# Patient Record
Sex: FEMALE | Race: BLACK OR AFRICAN AMERICAN | Employment: FULL TIME | URBAN - METROPOLITAN AREA
[De-identification: names, ages, dates, MRNs, and addresses within clinical notes are randomized per-mention and may not be internally consistent; named-entity substitution may affect disease eponyms.]

---

## 2019-05-21 ENCOUNTER — OFFICE VISIT (OUTPATIENT)
Dept: PRIMARY CARE CLINIC | Age: 34
End: 2019-05-21

## 2019-05-21 VITALS
SYSTOLIC BLOOD PRESSURE: 109 MMHG | RESPIRATION RATE: 18 BRPM | BODY MASS INDEX: 27.78 KG/M2 | DIASTOLIC BLOOD PRESSURE: 75 MMHG | TEMPERATURE: 98.3 F | HEART RATE: 85 BPM | WEIGHT: 177 LBS | OXYGEN SATURATION: 99 % | HEIGHT: 67 IN

## 2019-05-21 DIAGNOSIS — F17.210 LIGHT CIGARETTE SMOKER: ICD-10-CM

## 2019-05-21 DIAGNOSIS — E66.3 OVERWEIGHT (BMI 25.0-29.9): ICD-10-CM

## 2019-05-21 DIAGNOSIS — Z11.3 ROUTINE SCREENING FOR STI (SEXUALLY TRANSMITTED INFECTION): ICD-10-CM

## 2019-05-21 DIAGNOSIS — Z11.59 NEED FOR HEPATITIS C SCREENING TEST: ICD-10-CM

## 2019-05-21 DIAGNOSIS — Z00.00 WELL ADULT HEALTH CHECK: Primary | ICD-10-CM

## 2019-05-21 DIAGNOSIS — F12.90 MARIJUANA USE: ICD-10-CM

## 2019-05-21 RX ORDER — AMOXICILLIN 500 MG/1
500 TABLET, FILM COATED ORAL 3 TIMES DAILY
COMMUNITY
End: 2021-08-05 | Stop reason: ALTCHOICE

## 2019-05-21 RX ORDER — MEDROXYPROGESTERONE ACETATE 150 MG/ML
INJECTION, SUSPENSION INTRAMUSCULAR
Refills: 3 | COMMUNITY
Start: 2019-04-27 | End: 2020-05-18 | Stop reason: SDUPTHER

## 2019-05-21 RX ORDER — BISMUTH SUBSALICYLATE 262 MG
1 TABLET,CHEWABLE ORAL DAILY
COMMUNITY

## 2019-05-21 RX ORDER — VITAMIN E CAP 100 UNIT 100 UNIT
CAP ORAL DAILY
COMMUNITY
End: 2022-07-16

## 2019-05-21 NOTE — PROGRESS NOTES
HPI:     Chief Complaint   Patient presents with    Well Woman     with pap       Bobby Hawkins is a 28 y.o. female with no significant history who presents for annual exam.  She feels well today and has no concerns or complaints. Patient requests full STI check today. Reports that her partner was recently treated for UTI and wants to make sure she doesn't have STI. She is in monogamous relationship. She is asymptomatic, denies vaginal discharge, pruritus, suprapubic/abdominal pain, dyspareunia,  dysuria, frequency, fever, chills, nausea, vomiting. Reports that she went to urgent care last week for testing, but all they did was urinalysis. Told her there was \"bacteria\" in urine and started on 10 day course amoxicillin, which she has not finished yet. Patient reports pap smear in February 2018 was normal.  Hx of abnormal pap in the past, but most recent was normal.  She usually gets pap every year. No hx of colposcopy, LEEP. No family history of breast or ovarian cancer. Patient current on Depo for contraception for past 2 years. Doing well with this. Does not get period due to Depo. Patient reports that hep C antibody was positive in the past.  She was referred to specialist, but never went. She would like retested today. Pertinent past medical hstory: no history of HTN, DVT, CAD, DM, liver disease, or migraines. Patient Active Problem List   Diagnosis Code    Marijuana use F12.90    Overweight (BMI 25.0-29. 9) E66.3    Light cigarette smoker Z72.0     Current Outpatient Medications   Medication Sig Dispense Refill    amoxicillin 500 mg tab Take 500 mg by mouth three (3) times daily.  BIOTIN PO Take  by mouth.  multivitamin (ONE A DAY) tablet Take 1 Tab by mouth daily.  PNV66-Iron Fumarate-FA-DSS-DHA 26-1.2- mg cap Take  by mouth.  OTHER Black seed oil      vitamin e (E GEMS) 100 unit capsule Take  by mouth daily.       cranberry fruit extract (CRANBERRY EXTRACT PO) Take  by mouth.  medroxyPROGESTERone (DEPO-PROVERA) 150 mg/mL syrg INJ 1 ML IM Q 12 WEEKS  3     Allergies   Allergen Reactions    Sulfa (Sulfonamide Antibiotics) Hives     History reviewed. No pertinent past medical history. Past Surgical History:   Procedure Laterality Date    HX APPENDECTOMY      HX  SECTION      x2    HX TUBAL LIGATION      HX WISDOM TEETH EXTRACTION       Family History   Problem Relation Age of Onset    Hypertension Mother     High Cholesterol Mother     No Known Problems Father     No Known Problems Sister     Diabetes Maternal Grandmother     High Cholesterol Maternal Grandmother     Cancer Neg Hx     Heart Disease Neg Hx     Heart Attack Neg Hx     Stroke Neg Hx     Thyroid Disease Neg Hx     Breast Cancer Neg Hx      Social History     Tobacco Use    Smoking status: Light Tobacco Smoker    Smokeless tobacco: Never Used    Tobacco comment: smokes 1 cigarette per week    Substance Use Topics    Alcohol use: Yes     Alcohol/week: 4.2 oz     Types: 7 Shots of liquor per week     Comment: 1 vodka tonic daily           ROS:     ROS:  Feeling well. No dyspnea, palpitations, or chest pain on exertion. No abdominal pain, change in appetite, nausea, vomiting, change in bowel habits, black or bloody stools. No urinary tract symptoms. GYN ROS: normal menses, no abnormal bleeding, pelvic pain or discharge, no breast pain or new or enlarging lumps on self exam. No neurological complaints. No fever or chills. Otherwise, as documented in HPI. Physical Exam:     Vitals:    19 1114   BP: 109/75   Pulse: 85   Resp: 18   Temp: 98.3 °F (36.8 °C)   TempSrc: Oral   SpO2: 99%   Weight: 177 lb (80.3 kg)   Height: 5' 6.5\" (1.689 m)        Nursing Documentation and Vital Signs Reviewed.      Physical Examination:   General appearance - alert, well appearing, and in no distress  Mental status - alert, oriented to person, place, and time, normal mood, behavior, speech, dress, motor activity, and thought processes  Eyes - pupils equal and reactive, extraocular eye movements intact  Ears - bilateral TM's and external ear canals normal  Nose - normal and patent, no erythema, discharge or polyps  Mouth - mucous membranes moist, pharynx normal without lesions  Neck - supple, no significant adenopathy, thyroid is normal in size without nodules or tenderness  Chest - clear to auscultation, no wheezes, rales or rhonchi, symmetric air entry  Heart - normal rate, regular rhythm, normal S1, S2, no murmurs, rubs, clicks or gallops  Abdomen - soft, nontender, nondistended, no masses or organomegaly  Pelvic - VULVA: normal appearing vulva with no masses, tenderness or lesions, VAGINA: normal appearing vagina with normal color and discharge, no lesions, CERVIX: normal appearing cervix without discharge or lesions, UTERUS: uterus is normal size, shape, consistency and nontender, ADNEXA: normal adnexa in size, nontender and no masses, exam chaperoned by BLAS  Neurological - alert, oriented, normal speech, no focal findings or movement disorder noted, DTR's normal and symmetric, normal muscle tone, no tremors, strength 5/5  Musculoskeletal - no joint tenderness, deformity or swelling  Extremities - peripheral pulses normal, no pedal edema, no clubbing or cyanosis    3 most recent PHQ Screens 5/21/2019   Little interest or pleasure in doing things Not at all   Feeling down, depressed, irritable, or hopeless Not at all   Total Score PHQ 2 0       Assessment/ Plan:   Healthy adult female. Full age appropriate history and physical exam as well as health care maintenance  performed and discussed today. Risk factor modification discussed today includes safe sex practices, healthy diet and exercise, and seat belt use. Counseled on breast self-exam and mammography schedule. Pap smear schedule reviewed. Continue current medications as prescribed.     Diagnoses and all orders for this visit:    1. Well adult health check  -     CBC WITH AUTOMATED DIFF  -     METABOLIC PANEL, COMPREHENSIVE  -     TSH 3RD GENERATION  -     LIPID PANEL        -     PAP IG,CT-NG, APTIMA HPV AND RFX 16/18,45 ([id number])    2. Routine screening for STI (sexually transmitted infection)  -     Patient requests STI testing. She is asymptomatic.   -     HIV 1/2 AG/AB, 4TH GENERATION,W RFLX CONFIRM  -     HSV 1 AND 2 ABS, IGM  -     RPR  -     HEPATITIS C AB  -     HEP B SURFACE AG  -     NUSWAB VAGINITIS PLUS    3. Need for hepatitis C screening test  -     Patient reports hx of positive hep C antibody test, but never followed-up on it. No previous records available during visit.  -     HEPATITIS C AB    4. Marijuana use        -     Daily marijuana use. Cessation counseling provided. Reviewed potential risks and long term consequences of marijuana use. Patient is not ready to cut back or quit at this time, but was advised to follow-up if she needs help with cessation. 5. Light cigarette smoker        -     Smokes 1 cigarette per week socially. Smoking cessation counseling provided. Discussed risks and long term consequences of smoking. Patient reports she would be able to quit at any time. I recommended complete cessation. She is to follow-up if she needs any help with this. 6. Overweight (BMI 25.0-29.9)  -     BMI discussed with patient. Discussed lifestyle changes, daily physical activity, and advised 150 minutes of exercise weekly. Discussed healthy diet choices and limiting fried, fatty foods, fast foods, processed foods, sugar-sweetened beverages/soda, and added sugars. Increase fruits, vegetables, low-fat dairy products, lean proteins, and whole grains.    -    TSH 3RD GENERATION  -     LIPID PANEL      Follow-up and Dispositions    · Return in about 1 year (around 5/21/2020) for annual exam, sooner as needed .          Discussed expected course/resolution/complications of diagnosis in detail with patient. Medication risks/benefits/costs/interactions/alternatives discussed with patient. Pt was given after visit summary which includes diagnoses, current medications & vitals.    Pt expressed understanding with the diagnosis and plan

## 2019-05-21 NOTE — PATIENT INSTRUCTIONS

## 2019-05-22 LAB
ALBUMIN SERPL-MCNC: 4.4 G/DL (ref 3.5–5.5)
ALBUMIN/GLOB SERPL: 1.5 {RATIO} (ref 1.2–2.2)
ALP SERPL-CCNC: 76 IU/L (ref 39–117)
ALT SERPL-CCNC: 16 IU/L (ref 0–32)
AST SERPL-CCNC: 16 IU/L (ref 0–40)
BASOPHILS # BLD AUTO: 0 X10E3/UL (ref 0–0.2)
BASOPHILS NFR BLD AUTO: 0 %
BILIRUB SERPL-MCNC: <0.2 MG/DL (ref 0–1.2)
BUN SERPL-MCNC: 12 MG/DL (ref 6–20)
BUN/CREAT SERPL: 14 (ref 9–23)
CALCIUM SERPL-MCNC: 9.5 MG/DL (ref 8.7–10.2)
CHLORIDE SERPL-SCNC: 109 MMOL/L (ref 96–106)
CO2 SERPL-SCNC: 18 MMOL/L (ref 20–29)
CREAT SERPL-MCNC: 0.84 MG/DL (ref 0.57–1)
EOSINOPHIL # BLD AUTO: 0.1 X10E3/UL (ref 0–0.4)
EOSINOPHIL NFR BLD AUTO: 1 %
ERYTHROCYTE [DISTWIDTH] IN BLOOD BY AUTOMATED COUNT: 15.2 % (ref 12.3–15.4)
GLOBULIN SER CALC-MCNC: 3 G/DL (ref 1.5–4.5)
GLUCOSE SERPL-MCNC: 88 MG/DL (ref 65–99)
HBV SURFACE AG SERPL QL IA: NEGATIVE
HCT VFR BLD AUTO: 36.7 % (ref 34–46.6)
HCV AB S/CO SERPL IA: 3.4 S/CO RATIO (ref 0–0.9)
HGB BLD-MCNC: 11.8 G/DL (ref 11.1–15.9)
HIV 1+2 AB+HIV1 P24 AG SERPL QL IA: NON REACTIVE
HSV1 IGM TITR SER IF: NORMAL TITER
HSV2 IGM TITR SER IF: NORMAL TITER
IMM GRANULOCYTES # BLD AUTO: 0 X10E3/UL (ref 0–0.1)
IMM GRANULOCYTES NFR BLD AUTO: 0 %
LYMPHOCYTES # BLD AUTO: 2.3 X10E3/UL (ref 0.7–3.1)
LYMPHOCYTES NFR BLD AUTO: 33 %
MCH RBC QN AUTO: 26.2 PG (ref 26.6–33)
MCHC RBC AUTO-ENTMCNC: 32.2 G/DL (ref 31.5–35.7)
MCV RBC AUTO: 82 FL (ref 79–97)
MONOCYTES # BLD AUTO: 0.4 X10E3/UL (ref 0.1–0.9)
MONOCYTES NFR BLD AUTO: 6 %
NEUTROPHILS # BLD AUTO: 4.1 X10E3/UL (ref 1.4–7)
NEUTROPHILS NFR BLD AUTO: 60 %
PLATELET # BLD AUTO: 288 X10E3/UL (ref 150–450)
POTASSIUM SERPL-SCNC: 4.3 MMOL/L (ref 3.5–5.2)
PROT SERPL-MCNC: 7.4 G/DL (ref 6–8.5)
RBC # BLD AUTO: 4.5 X10E6/UL (ref 3.77–5.28)
RPR SER QL: NON REACTIVE
SODIUM SERPL-SCNC: 141 MMOL/L (ref 134–144)
TSH SERPL DL<=0.005 MIU/L-ACNC: 1.52 UIU/ML (ref 0.45–4.5)
WBC # BLD AUTO: 7 X10E3/UL (ref 3.4–10.8)

## 2019-05-23 ENCOUNTER — TELEPHONE (OUTPATIENT)
Dept: PRIMARY CARE CLINIC | Age: 34
End: 2019-05-23

## 2019-05-23 DIAGNOSIS — N76.0 BACTERIAL VAGINOSIS: Primary | ICD-10-CM

## 2019-05-23 DIAGNOSIS — B96.89 BACTERIAL VAGINOSIS: Primary | ICD-10-CM

## 2019-05-23 DIAGNOSIS — R76.8 POSITIVE HEPATITIS C ANTIBODY TEST: Primary | ICD-10-CM

## 2019-05-23 LAB
A VAGINAE DNA VAG QL NAA+PROBE: ABNORMAL SCORE
BVAB2 DNA VAG QL NAA+PROBE: ABNORMAL SCORE
C ALBICANS DNA VAG QL NAA+PROBE: NEGATIVE
C GLABRATA DNA VAG QL NAA+PROBE: NEGATIVE
C TRACH RRNA SPEC QL NAA+PROBE: NEGATIVE
MEGA1 DNA VAG QL NAA+PROBE: ABNORMAL SCORE
N GONORRHOEA RRNA SPEC QL NAA+PROBE: NEGATIVE
T VAGINALIS RRNA SPEC QL NAA+PROBE: NEGATIVE

## 2019-05-23 RX ORDER — METRONIDAZOLE 500 MG/1
500 TABLET ORAL 2 TIMES DAILY
Qty: 14 TAB | Refills: 0 | Status: SHIPPED | OUTPATIENT
Start: 2019-05-23 | End: 2019-05-30

## 2019-05-23 NOTE — TELEPHONE ENCOUNTER
----- Message from Roney Valentin NP sent at 2/85/5728  9:51 AM EDT -----  Please call patient:    Vaginal swab is positive for bacterial infection (BV). Start metronidazole twice daily for 7 days. There is no pharmacy listed in her chart, can you find out which pharmacy she would like script sent to? Med side effects may include headache, nausea, metallic taste, diarrhea. Do not drink alcohol while taking this medication. Negative for chlamydia, gonorrhea, trich.

## 2019-05-23 NOTE — PROGRESS NOTES
Please call patient:    Vaginal swab is positive for bacterial infection (BV). Start metronidazole twice daily for 7 days. There is no pharmacy listed in her chart, can you find out which pharmacy she would like script sent to? Med side effects may include headache, nausea, metallic taste, diarrhea. Do not drink alcohol while taking this medication. Negative for chlamydia, gonorrhea, trich.

## 2019-05-23 NOTE — TELEPHONE ENCOUNTER
----- Message from Iqra Adkins NP sent at 5/73/4275  7:51 AM EDT -----  Please call patient:    Hepatitis C antibody screen is positive, she needs to come back in for confirmatory blood work, this can be lab visit, I will put order in. Will also put in referral to see liver specialist.      Otherwise, CBC, kidney, liver, thyroid level are normal.  Negative HIV, herpes, syphilis, hepatitis B.

## 2019-05-23 NOTE — PROGRESS NOTES
Please call patient:    Hepatitis C antibody screen is positive, she needs to come back in for confirmatory blood work, this can be lab visit, I will put order in. Will also put in referral to see liver specialist.      Otherwise, CBC, kidney, liver, thyroid level are normal.  Negative HIV, herpes, syphilis, hepatitis B.

## 2019-05-27 LAB
C TRACH RRNA CVX QL NAA+PROBE: NEGATIVE
CYTOLOGIST CVX/VAG CYTO: ABNORMAL
CYTOLOGY CVX/VAG DOC CYTO: ABNORMAL
CYTOLOGY CVX/VAG DOC THIN PREP: ABNORMAL
DX ICD CODE: ABNORMAL
HPV GENOTYPE 18,45: NEGATIVE
HPV I/H RISK 4 DNA CVX QL PROBE+SIG AMP: POSITIVE
HPV16 DNA CVX QL PROBE+SIG AMP: NEGATIVE
Lab: ABNORMAL
N GONORRHOEA RRNA CVX QL NAA+PROBE: NEGATIVE
OTHER STN SPEC: ABNORMAL
STAT OF ADQ CVX/VAG CYTO-IMP: ABNORMAL

## 2019-05-28 ENCOUNTER — TELEPHONE (OUTPATIENT)
Dept: PRIMARY CARE CLINIC | Age: 34
End: 2019-05-28

## 2019-05-28 NOTE — PROGRESS NOTES
Please notify patient:    Pap smear cytology is negative. HPV test is positive, but high risk HPV strains are negative. Will need to repeat co-testing in 1 year.

## 2019-05-28 NOTE — TELEPHONE ENCOUNTER
----- Message from Ana Hermosillo NP sent at 6/53/8203  8:11 AM EDT -----  Please notify patient:    Pap smear cytology is negative. HPV test is positive, but high risk HPV strains are negative. Will need to repeat co-testing in 1 year.

## 2019-05-29 ENCOUNTER — TELEPHONE (OUTPATIENT)
Dept: PRIMARY CARE CLINIC | Age: 34
End: 2019-05-29

## 2019-05-29 NOTE — TELEPHONE ENCOUNTER
We received [employer] paperwork for her, she stated she does not need it to be filled out anymore and we could shred it. I let her know I would do that.

## 2019-06-26 ENCOUNTER — TELEPHONE (OUTPATIENT)
Dept: PRIMARY CARE CLINIC | Age: 34
End: 2019-06-26

## 2019-06-26 NOTE — TELEPHONE ENCOUNTER
Patient states she thinks she may have another bacterial infection, she is coming into the office on 7/1/19 to get it checked out but asked for advice to help prevent it. I let her know scents and soaps cause this mainly but as well as douching and sex can cause it, she verbalized her understanding and thanked me.

## 2020-05-18 RX ORDER — MEDROXYPROGESTERONE ACETATE 150 MG/ML
INJECTION, SUSPENSION INTRAMUSCULAR
Qty: 1 SYRINGE | Refills: 3
Start: 2020-05-18 | End: 2020-05-19 | Stop reason: SDUPTHER

## 2020-05-18 NOTE — TELEPHONE ENCOUNTER
Requested Prescriptions     Pending Prescriptions Disp Refills    medroxyPROGESTERone (DEPO-PROVERA) 150 mg/mL syrg 1 Syringe 3     Grace Hinojosa and Humberto Waddell

## 2020-05-19 RX ORDER — MEDROXYPROGESTERONE ACETATE 150 MG/ML
INJECTION, SUSPENSION INTRAMUSCULAR
Qty: 1 SYRINGE | Refills: 3 | Status: CANCELLED
Start: 2020-05-19

## 2020-05-19 RX ORDER — MEDROXYPROGESTERONE ACETATE 150 MG/ML
INJECTION, SUSPENSION INTRAMUSCULAR
Qty: 1 SYRINGE | Refills: 3
Start: 2020-05-19 | End: 2022-07-16

## 2020-05-19 NOTE — TELEPHONE ENCOUNTER
Patient states RX was [facility] to [facility], she would like this sent to [facility] and [address] [facility]. I called pharmacy and the pharmacist states she cannot find RX sent yesterday. Please sent Depo shot to pharmacy on file.

## 2021-08-05 ENCOUNTER — VIRTUAL VISIT (OUTPATIENT)
Dept: PRIMARY CARE CLINIC | Age: 36
End: 2021-08-05
Payer: MEDICAID

## 2021-08-05 DIAGNOSIS — B96.89 BV (BACTERIAL VAGINOSIS): Primary | ICD-10-CM

## 2021-08-05 DIAGNOSIS — N76.0 BV (BACTERIAL VAGINOSIS): Primary | ICD-10-CM

## 2021-08-05 PROCEDURE — 99213 OFFICE O/P EST LOW 20 MIN: CPT | Performed by: NURSE PRACTITIONER

## 2021-08-05 RX ORDER — METRONIDAZOLE 500 MG/1
500 TABLET ORAL 2 TIMES DAILY
Qty: 14 TABLET | Refills: 0 | Status: SHIPPED | OUTPATIENT
Start: 2021-08-05 | End: 2021-08-12

## 2021-08-05 NOTE — PROGRESS NOTES
Héctor Beavers (: 1985) is a 39 y.o. female, established patient, here for evaluation of the following chief complaint(s):   Vaginal Discharge       ASSESSMENT/PLAN:  Below is the assessment and plan developed based on review of pertinent labs, studies, and medications. 1. BV (bacterial vaginosis)  -     metroNIDAZOLE (FLAGYL) 500 mg tablet; Take 1 Tablet by mouth two (2) times a day for 7 days. , Normal, Disp-14 Tablet, R-0      Return if symptoms worsen or fail to improve. SUBJECTIVE/OBJECTIVE:  HPI    Presents with suspected BV. Symptoms presented several days ago and are getting progressively worse. Vaginal discharge is white and has an order that smells like BV. Started about 1 week ago. No itching or burning. Similar to previous BV symptoms. She is sexually active with the same partner. Review of Systems   Constitutional: Negative for fever. Respiratory: Negative for cough. Genitourinary: Positive for vaginal discharge. Negative for difficulty urinating and dysuria. No flowsheet data found. Physical Exam  Vitals reviewed. Constitutional:       General: She is not in acute distress. Appearance: She is not diaphoretic. HENT:      Head: Normocephalic and atraumatic. Eyes:      Conjunctiva/sclera: Conjunctivae normal.   Neck:      Trachea: Phonation normal.   Pulmonary:      Effort: Pulmonary effort is normal. No respiratory distress. Skin:     General: Skin is dry. Neurological:      Mental Status: She is alert. Psychiatric:         Mood and Affect: Mood normal.         Behavior: Behavior normal.                 Héctor Beavers, was evaluated through a synchronous (real-time) audio-video encounter. The patient (or guardian if applicable) is aware that this is a billable service. Verbal consent to proceed has been obtained within the past 12 months.  The visit was conducted pursuant to the emergency declaration under the [address] [place] [address] Act, [facility] waiver authority and the [employer] General Act. Patient identification was verified, and a caregiver was present when appropriate. The patient was located in a state where the provider was credentialed to provide care. An electronic signature was used to authenticate this note.   -- Denia Wright NP

## 2022-03-18 PROBLEM — F12.90 MARIJUANA USE: Status: ACTIVE | Noted: 2019-05-21

## 2022-03-18 PROBLEM — E66.3 OVERWEIGHT (BMI 25.0-29.9): Status: ACTIVE | Noted: 2019-05-21

## 2022-03-18 PROBLEM — F17.210 LIGHT CIGARETTE SMOKER: Status: ACTIVE | Noted: 2019-05-21

## 2022-07-16 ENCOUNTER — HOSPITAL ENCOUNTER (EMERGENCY)
Age: 37
Discharge: HOME | End: 2022-07-16
Attending: EMERGENCY MEDICINE
Payer: COMMERCIAL

## 2022-07-16 VITALS
OXYGEN SATURATION: 100 % | HEART RATE: 57 BPM | RESPIRATION RATE: 18 BRPM | SYSTOLIC BLOOD PRESSURE: 129 MMHG | TEMPERATURE: 98.4 F | DIASTOLIC BLOOD PRESSURE: 83 MMHG | HEIGHT: 65 IN | BODY MASS INDEX: 28.32 KG/M2 | WEIGHT: 170 LBS

## 2022-07-16 DIAGNOSIS — U07.1 COVID: Primary | ICD-10-CM

## 2022-07-16 PROCEDURE — 99282 EMERGENCY DEPT VISIT SF MDM: CPT

## 2022-07-16 NOTE — ED PROVIDER NOTES
EMERGENCY DEPARTMENT HISTORY AND PHYSICAL EXAM      Date: 2022  Patient Name: Jenny Wong    History of Presenting Illness     Chief Complaint   Patient presents with    Well Woman       History Provided By: Patient    HPI: Jenny oWng, 44 y.o. female with a past medical history significant No significant past medical history presents to the ED with cc of positive COVID test.  Patient exposure at a dance recital last week. Her daughter tested positive for COVID as well. She has no symptoms that she wanted to confirm her test was positive though her home test prior to arrival was positive. She is otherwise been in her normal state of health. Does states she drinks alcohol last night and felt hung over this morning but feels back to baseline now. She is fully vaccinated and boosted. There are no other complaints, changes, or physical findings at this time. PCP: Kris Caicedo MD    No current facility-administered medications on file prior to encounter. Current Outpatient Medications on File Prior to Encounter   Medication Sig Dispense Refill    multivitamin (ONE A DAY) tablet Take 1 Tab by mouth daily.  [DISCONTINUED] medroxyPROGESTERone (DEPO-PROVERA) 150 mg/mL syrg INJ 1 ML IM Q 12 WEEKS 1 Syringe 3    [DISCONTINUED] BIOTIN PO Take  by mouth.  [DISCONTINUED] PNV66-Iron Fumarate-FA-DSS-DHA 26-1.2- mg cap Take  by mouth.  [DISCONTINUED] OTHER Black seed oil      [DISCONTINUED] vitamin e (E GEMS) 100 unit capsule Take  by mouth daily.  [DISCONTINUED] cranberry fruit extract (CRANBERRY EXTRACT PO) Take  by mouth. Past History     Past Medical History:  History reviewed. No pertinent past medical history.     Past Surgical History:  Past Surgical History:   Procedure Laterality Date    HX APPENDECTOMY  2013    HX  SECTION      x2    HX TUBAL LIGATION  2012    HX WISDOM TEETH EXTRACTION         Family History:  Family History   Problem Relation Age of Onset    Hypertension Mother     High Cholesterol Mother     No Known Problems Father     No Known Problems Sister     Diabetes Maternal Grandmother     High Cholesterol Maternal Grandmother     Cancer Neg Hx     Heart Disease Neg Hx     Heart Attack Neg Hx     Stroke Neg Hx     Thyroid Disease Neg Hx     Breast Cancer Neg Hx        Social History:  Social History     Tobacco Use    Smoking status: Light Tobacco Smoker    Smokeless tobacco: Never Used    Tobacco comment: smokes 1 cigarette per day   Substance Use Topics    Alcohol use: Yes     Alcohol/week: 7.0 standard drinks     Types: 7 Shots of liquor per week     Comment: 1 vodka tonic daily     Drug use: Yes     Types: Marijuana     Comment: daily        Allergies: Allergies   Allergen Reactions    Sulfa (Sulfonamide Antibiotics) Hives         Review of Systems     Review of Systems   Constitutional: Negative for activity change and fever. HENT: Negative for rhinorrhea and sore throat. Eyes: Negative for visual disturbance. Respiratory: Negative for cough. Cardiovascular: Negative for chest pain. Gastrointestinal: Negative for abdominal pain, diarrhea, nausea and vomiting. Genitourinary: Negative for dysuria. Musculoskeletal: Negative for arthralgias and myalgias. Skin: Negative for rash and wound. Neurological: Negative for syncope and headaches. Psychiatric/Behavioral: Negative for confusion. All other systems reviewed and are negative. Physical Exam     Physical Exam  Vitals and nursing note reviewed. Constitutional:       Appearance: Normal appearance. She is normal weight. HENT:      Head: Normocephalic and atraumatic. Nose: Nose normal.      Mouth/Throat:      Mouth: Mucous membranes are moist.   Eyes:      Conjunctiva/sclera: Conjunctivae normal.   Cardiovascular:      Rate and Rhythm: Normal rate. Pulses: Normal pulses.    Pulmonary:      Effort: Pulmonary effort is normal. No respiratory distress. Musculoskeletal:         General: No swelling or deformity. Normal range of motion. Skin:     General: Skin is warm and dry. Findings: No rash. Neurological:      General: No focal deficit present. Mental Status: She is alert. Psychiatric:         Mood and Affect: Mood normal.         Behavior: Behavior normal.         Lab and Diagnostic Study Results     Labs -   No results found for this or any previous visit (from the past 12 hour(s)). Radiologic Studies -   @lastxrresult@  CT Results  (Last 48 hours)    None        CXR Results  (Last 48 hours)    None            Medical Decision Making   - I am the first provider for this patient. - I reviewed the vital signs, available nursing notes, past medical history, past surgical history, family history and social history. - Initial assessment performed. The patients presenting problems have been discussed, and they are in agreement with the care plan formulated and outlined with them. I have encouraged them to ask questions as they arise throughout their visit. Vital Signs-Reviewed the patient's vital signs. Patient Vitals for the past 12 hrs:   Temp Pulse Resp BP SpO2   07/16/22 1438 98.4 °F (36.9 °C) (!) 57 18 129/83 100 %       Records Reviewed: Nursing Notes        ED Course:     ED Course as of 07/16/22 1548   Sat Jul 16, 2022   134 42-year-old female presents for evaluation of a positive COVID test at home. Known exposure last week at a dance recital.  Her daughter also has symptomatic COVID. She has no symptoms. Discussed that her positive test at home he considered a true positive and she should isolate. Discussed symptomatic care if she should develop symptoms. She understands agrees with plan. Discharged home. [LW]      ED Course User Index  [LW] Eliceo Hummel MD           Disposition   Disposition: DC- Adult Discharges: All of the diagnostic tests were reviewed and questions answered.  Diagnosis, care plan and treatment options were discussed. The patient understands the instructions and will follow up as directed. The patients results have been reviewed with them. They have been counseled regarding their diagnosis. The patient verbally convey understanding and agreement of the signs, symptoms, diagnosis, treatment and prognosis and additionally agrees to follow up as recommended with their PCP in 24 - 48 hours. They also agree with the care-plan and convey that all of their questions have been answered. I have also put together some discharge instructions for them that include: 1) educational information regarding their diagnosis, 2) how to care for their diagnosis at home, as well a 3) list of reasons why they would want to return to the ED prior to their follow-up appointment, should their condition change. Discharged    DISCHARGE PLAN:  1. Current Discharge Medication List      CONTINUE these medications which have NOT CHANGED    Details   multivitamin (ONE A DAY) tablet Take 1 Tab by mouth daily. 2.   Follow-up Information     Follow up With Specialties Details Why Contact Milton Quintanilla, NP Nurse Practitioner In 3 days As needed [facility] [address] Emergency Medicine  As needed [address] [place] [id number] [phone]        3. Return to ED if worse   4. Discharge Medication List as of 7/16/2022  3:19 PM            Diagnosis     Clinical Impression:   1. COVID        Attestations:    Anahi Anders MD    Please note that this dictation was completed with [employer], the computer voice recognition software. Quite often unanticipated grammatical, syntax, homophones, and other interpretive errors are inadvertently transcribed by the computer software. Please disregard these errors. Please excuse any errors that have escaped final proofreading. Thank you.

## 2022-07-18 ENCOUNTER — PATIENT OUTREACH (OUTPATIENT)
Dept: CASE MANAGEMENT | Age: 37
End: 2022-07-18

## 2022-07-18 NOTE — PROGRESS NOTES
Patient contacted regarding COVID-19 diagnosis. Discussed COVID-19 related testing which was available at this time. Test results were positive. Patient informed of results, if available? yes. LPN Care Coordinator contacted the patient by telephone to perform post discharge assessment. Call within 2 business days of discharge: Yes Verified name and  with patient as identifiers. Provided introduction to self, and explanation of the CTN/ACM role, and reason for call due to risk factors for infection and/or exposure to COVID-19. Symptoms reviewed with patient who verbalized the following symptoms: no new symptoms      Due to no new or worsening symptoms encounter was not routed to provider for escalation. Discussed follow-up appointments. If no appointment was previously scheduled, appointment scheduling offered:  no. [id number]  follow up appointment(s): No future appointments. Non-[facility] follow up appointment(s): no    Interventions to address risk factors: Reviewed and followed up on pending diagnostic tests and treatments-COVID-19  and Education of patient/family/caregiver/guardian to support self-management-patient to follow up with PCP as needed. Advance Care Planning:   Does patient have an Advance Directive: not on file. Educated patient about risk for severe COVID-19 due to risk factors according to CDC guidelines. LPN CC reviewed discharge instructions, medical action plan and red flag symptoms with the family who verbalized understanding. Discussed COVID vaccination status: yes. Education provided on COVID-19 vaccination as appropriate. Discussed exposure protocols and quarantine with CDC Guidelines. Family was given an opportunity to verbalize any questions and concerns and agrees to contact LPN CC or health care provider for questions related to their healthcare.     Reviewed and educated family on any new and changed medications related to discharge diagnosis     Was patient discharged with a pulse oximeter? no    LPN CC provided contact information. Plan for follow-up call in 5-7 days based on severity of symptoms and risk factors.

## 2022-07-25 ENCOUNTER — PATIENT OUTREACH (OUTPATIENT)
Dept: CASE MANAGEMENT | Age: 37
End: 2022-07-25

## 2022-07-25 NOTE — PROGRESS NOTES
Patient resolved from [address] Transitions episode on 7/25/2022. Discussed COVID-19 related testing which was  done prior  at this time. Test results were positive. Patient informed of results, if available? yes     Patient/family has been provided the following resources and education related to COVID-19:                         Signs, symptoms and red flags related to COVID-19            [facility] exposure and quarantine guidelines            [employer] exposure contact - [phone]            Contact for their local Department of Health                 Patient currently reports that the following symptoms have improved:  no new symptoms and no worsening symptoms. No further outreach scheduled with this CTN/ACM/LPN/HC/ MA. Episode of Care resolved. Patient has this CTN/ACM/LPN/HC/MA contact information if future needs arise.

## 2024-11-07 NOTE — TELEPHONE ENCOUNTER
Discussed results and recommendations with patient, she verbalized her understanding. 06-Nov-2024 18:00